# Patient Record
Sex: MALE | Race: WHITE
[De-identification: names, ages, dates, MRNs, and addresses within clinical notes are randomized per-mention and may not be internally consistent; named-entity substitution may affect disease eponyms.]

---

## 2014-06-06 VITALS
SYSTOLIC BLOOD PRESSURE: 154 MMHG | DIASTOLIC BLOOD PRESSURE: 87 MMHG | SYSTOLIC BLOOD PRESSURE: 154 MMHG | DIASTOLIC BLOOD PRESSURE: 87 MMHG | DIASTOLIC BLOOD PRESSURE: 87 MMHG | SYSTOLIC BLOOD PRESSURE: 154 MMHG | SYSTOLIC BLOOD PRESSURE: 154 MMHG | SYSTOLIC BLOOD PRESSURE: 154 MMHG | SYSTOLIC BLOOD PRESSURE: 154 MMHG | DIASTOLIC BLOOD PRESSURE: 87 MMHG | DIASTOLIC BLOOD PRESSURE: 87 MMHG | SYSTOLIC BLOOD PRESSURE: 154 MMHG | DIASTOLIC BLOOD PRESSURE: 87 MMHG | SYSTOLIC BLOOD PRESSURE: 154 MMHG | DIASTOLIC BLOOD PRESSURE: 87 MMHG | DIASTOLIC BLOOD PRESSURE: 87 MMHG | DIASTOLIC BLOOD PRESSURE: 87 MMHG | SYSTOLIC BLOOD PRESSURE: 154 MMHG | SYSTOLIC BLOOD PRESSURE: 154 MMHG | DIASTOLIC BLOOD PRESSURE: 87 MMHG

## 2017-03-13 ENCOUNTER — HOSPITAL ENCOUNTER (OUTPATIENT)
Dept: HOSPITAL 61 - LAB | Age: 67
Discharge: HOME | End: 2017-03-13
Payer: MEDICARE

## 2017-03-13 DIAGNOSIS — N12: ICD-10-CM

## 2017-03-13 DIAGNOSIS — N18.3: Primary | ICD-10-CM

## 2017-03-13 DIAGNOSIS — N13.30: ICD-10-CM

## 2017-03-13 DIAGNOSIS — I10: ICD-10-CM

## 2017-03-13 LAB
ALBUMIN SERPL-MCNC: 4 G/DL (ref 3.4–5)
ANION GAP SERPL CALC-SCNC: 7 MMOL/L (ref 6–14)
BASOPHILS # BLD AUTO: 0.1 X10^3/UL (ref 0–0.2)
BASOPHILS NFR BLD: 1 % (ref 0–3)
BUN SERPL-MCNC: 20 MG/DL (ref 8–26)
CALCIUM SERPL-MCNC: 9.3 MG/DL (ref 8.5–10.1)
CHLORIDE SERPL-SCNC: 106 MMOL/L (ref 98–107)
CO2 SERPL-SCNC: 31 MMOL/L (ref 21–32)
CREAT SERPL-MCNC: 1.2 MG/DL (ref 0.7–1.3)
EOSINOPHIL NFR BLD: 2 % (ref 0–3)
ERYTHROCYTE [DISTWIDTH] IN BLOOD BY AUTOMATED COUNT: 14 % (ref 11.5–14.5)
GFR SERPLBLD BASED ON 1.73 SQ M-ARVRAT: 60.2 ML/MIN
GLUCOSE SERPL-MCNC: 91 MG/DL (ref 70–99)
HCT VFR BLD CALC: 47 % (ref 39–53)
HGB BLD-MCNC: 16 G/DL (ref 13–17.5)
LYMPHOCYTES # BLD: 3.2 X10^3/UL (ref 1–4.8)
LYMPHOCYTES NFR BLD AUTO: 32 % (ref 24–48)
MCH RBC QN AUTO: 32 PG (ref 25–35)
MCHC RBC AUTO-ENTMCNC: 34 G/DL (ref 31–37)
MCV RBC AUTO: 95 FL (ref 79–100)
MONOCYTES NFR BLD: 8 % (ref 0–9)
NEUTROPHILS NFR BLD AUTO: 57 % (ref 31–73)
PHOSPHATE SERPL-MCNC: 4 MG/DL (ref 2.6–4.7)
PLATELET # BLD AUTO: 262 X10^3/UL (ref 140–400)
POTASSIUM SERPL-SCNC: 4.3 MMOL/L (ref 3.5–5.1)
RBC # BLD AUTO: 4.95 X10^6/UL (ref 4.3–5.7)
SODIUM SERPL-SCNC: 144 MMOL/L (ref 136–145)
WBC # BLD AUTO: 10.2 X10^3/UL (ref 4–11)

## 2017-03-13 PROCEDURE — 83970 ASSAY OF PARATHORMONE: CPT

## 2017-03-13 PROCEDURE — 36415 COLL VENOUS BLD VENIPUNCTURE: CPT

## 2017-03-13 PROCEDURE — 80069 RENAL FUNCTION PANEL: CPT

## 2017-03-13 PROCEDURE — 85027 COMPLETE CBC AUTOMATED: CPT

## 2017-03-14 LAB — PTH-INTACT SERPL-MCNC: 59 PG/ML (ref 15–65)

## 2017-08-22 ENCOUNTER — HOSPITAL ENCOUNTER (OUTPATIENT)
Dept: HOSPITAL 61 - CT | Age: 67
Discharge: HOME | End: 2017-08-22
Attending: INTERNAL MEDICINE
Payer: MEDICARE

## 2017-08-22 DIAGNOSIS — R91.1: ICD-10-CM

## 2017-08-22 DIAGNOSIS — R91.8: Primary | ICD-10-CM

## 2017-08-22 PROCEDURE — 71250 CT THORAX DX C-: CPT

## 2017-08-22 NOTE — RAD
CT chest without IV contrast



Indication: 66-year-old male for follow-up of lung nodule.



Technique: CT chest without IV contrast with multi planar reformats.



Comparison: Previous CT from 11/8/2016.



Findings:

Neck base is clear. No axillary, mediastinal or hilar adenopathy. Heart is

normal in size. Coronary artery calcifications noted. No pericardial or

pleural effusion. CABG changes noted.

 Lung nodules as follows:

-Stable left lower lobe nodule measuring 8 mm (series 3 image 216).

-Stable right lower lobe nodule measuring 5 mm (series 3 image 114).

-Stable 2 mm nodule in the inferior aspect of the right upper lobe (series 3

image 142).

-Stable triangular opacity measuring 3 mm in the right middle lobe (series 3

image 181).



The noncontrast appearance of the visualized liver, spleen, pancreas, adrenals

and kidneys is within normal limits. No radiopaque gallstones. Stable 2.6 x

6.2 cm right anterolateral lower chest wall intramuscular lipoma. Stable 1 cm

superficial lesion within the right lateral chest wall likely sebaceous cyst.



No suspicious bony lesion.



Impression:

Stable pulmonary nodules, the largest in the left lower lobe measuring 8 mm.

Follow-up study in 6-12 months recommended.



PQRS Compliance Statement:



One or more of the following individualized dose reduction techniques were

utilized for this examination:

1. Automated exposure control

2. Adjustment of the mA and/or kV according to patient size

3. Use of iterative reconstruction technique

## 2017-09-11 ENCOUNTER — HOSPITAL ENCOUNTER (OUTPATIENT)
Dept: HOSPITAL 61 - LAB | Age: 67
Discharge: HOME | End: 2017-09-11
Payer: MEDICARE

## 2017-09-11 DIAGNOSIS — I12.9: Primary | ICD-10-CM

## 2017-09-11 DIAGNOSIS — N18.2: ICD-10-CM

## 2017-09-11 DIAGNOSIS — N12: ICD-10-CM

## 2017-09-11 LAB
ALBUMIN SERPL-MCNC: 3.8 G/DL (ref 3.4–5)
ANION GAP SERPL CALC-SCNC: 5 MMOL/L (ref 6–14)
BUN SERPL-MCNC: 15 MG/DL (ref 8–26)
CALCIUM SERPL-MCNC: 8.8 MG/DL (ref 8.5–10.1)
CHLORIDE SERPL-SCNC: 103 MMOL/L (ref 98–107)
CO2 SERPL-SCNC: 30 MMOL/L (ref 21–32)
CREAT SERPL-MCNC: 1.2 MG/DL (ref 0.7–1.3)
GFR SERPLBLD BASED ON 1.73 SQ M-ARVRAT: 60.2 ML/MIN
GLUCOSE SERPL-MCNC: 127 MG/DL (ref 70–99)
PHOSPHATE SERPL-MCNC: 3.2 MG/DL (ref 2.6–4.7)
POTASSIUM SERPL-SCNC: 4.3 MMOL/L (ref 3.5–5.1)
SODIUM SERPL-SCNC: 138 MMOL/L (ref 136–145)

## 2017-09-11 PROCEDURE — 36415 COLL VENOUS BLD VENIPUNCTURE: CPT

## 2017-09-11 PROCEDURE — 80069 RENAL FUNCTION PANEL: CPT

## 2017-12-04 ENCOUNTER — HOSPITAL ENCOUNTER (OUTPATIENT)
Dept: HOSPITAL 61 - LAB | Age: 67
Discharge: HOME | End: 2017-12-04
Attending: FAMILY MEDICINE
Payer: MEDICARE

## 2017-12-04 ENCOUNTER — HOSPITAL ENCOUNTER (OUTPATIENT)
Dept: HOSPITAL 61 - CT | Age: 67
Discharge: HOME | End: 2017-12-04
Attending: INTERNAL MEDICINE
Payer: MEDICARE

## 2017-12-04 DIAGNOSIS — D17.1: ICD-10-CM

## 2017-12-04 DIAGNOSIS — R91.1: ICD-10-CM

## 2017-12-04 DIAGNOSIS — I70.0: ICD-10-CM

## 2017-12-04 DIAGNOSIS — K31.89: ICD-10-CM

## 2017-12-04 DIAGNOSIS — I71.4: Primary | ICD-10-CM

## 2017-12-04 DIAGNOSIS — Z12.5: Primary | ICD-10-CM

## 2017-12-04 DIAGNOSIS — N32.89: ICD-10-CM

## 2017-12-04 DIAGNOSIS — I71.4: ICD-10-CM

## 2017-12-04 DIAGNOSIS — K82.8: ICD-10-CM

## 2017-12-04 DIAGNOSIS — E78.5: ICD-10-CM

## 2017-12-04 DIAGNOSIS — I10: ICD-10-CM

## 2017-12-04 LAB
ALBUMIN SERPL-MCNC: 4.1 G/DL (ref 3.4–5)
ALBUMIN/GLOB SERPL: 1.1 {RATIO} (ref 1–1.7)
ALP SERPL-CCNC: 91 U/L (ref 46–116)
ALT SERPL-CCNC: 33 U/L (ref 16–63)
ANION GAP SERPL CALC-SCNC: 7 MMOL/L (ref 6–14)
AST SERPL-CCNC: 25 U/L (ref 15–37)
BASOPHILS # BLD AUTO: 0.1 X10^3/UL (ref 0–0.2)
BASOPHILS NFR BLD: 1 % (ref 0–3)
BILIRUB SERPL-MCNC: 0.5 MG/DL (ref 0.2–1)
BUN SERPL-MCNC: 19 MG/DL (ref 8–26)
BUN/CREAT SERPL: 16 (ref 6–20)
CALCIUM SERPL-MCNC: 9.2 MG/DL (ref 8.5–10.1)
CHLORIDE SERPL-SCNC: 103 MMOL/L (ref 98–107)
CHOLEST SERPL-MCNC: 153 MG/DL (ref 0–200)
CHOLEST/HDLC SERPL: 2.6 {RATIO}
CO2 SERPL-SCNC: 30 MMOL/L (ref 21–32)
CREAT SERPL-MCNC: 1.2 MG/DL (ref 0.7–1.3)
EOSINOPHIL NFR BLD: 4 % (ref 0–3)
ERYTHROCYTE [DISTWIDTH] IN BLOOD BY AUTOMATED COUNT: 14.1 % (ref 11.5–14.5)
GFR SERPLBLD BASED ON 1.73 SQ M-ARVRAT: 60 ML/MIN
GLOBULIN SER-MCNC: 3.8 G/DL (ref 2.2–3.8)
GLUCOSE SERPL-MCNC: 101 MG/DL (ref 70–99)
HCT VFR BLD CALC: 46.5 % (ref 39–53)
HDLC SERPL-MCNC: 60 MG/DL (ref 40–60)
HGB BLD-MCNC: 15.8 G/DL (ref 13–17.5)
LYMPHOCYTES # BLD: 3 X10^3/UL (ref 1–4.8)
LYMPHOCYTES NFR BLD AUTO: 34 % (ref 24–48)
MCH RBC QN AUTO: 33 PG (ref 25–35)
MCHC RBC AUTO-ENTMCNC: 34 G/DL (ref 31–37)
MCV RBC AUTO: 97 FL (ref 79–100)
MONOCYTES NFR BLD: 9 % (ref 0–9)
NEUTROPHILS NFR BLD AUTO: 53 % (ref 31–73)
NONHDLC SERPL-MCNC: 93 MG/DL (ref 0–129)
PLATELET # BLD AUTO: 300 X10^3/UL (ref 140–400)
POTASSIUM SERPL-SCNC: 5 MMOL/L (ref 3.5–5.1)
PROT SERPL-MCNC: 7.9 G/DL (ref 6.4–8.2)
RBC # BLD AUTO: 4.81 X10^6/UL (ref 4.3–5.7)
SODIUM SERPL-SCNC: 140 MMOL/L (ref 136–145)
TRIGL SERPL-MCNC: 77 MG/DL (ref 0–150)
WBC # BLD AUTO: 8.8 X10^3/UL (ref 4–11)

## 2017-12-04 PROCEDURE — 80061 LIPID PANEL: CPT

## 2017-12-04 PROCEDURE — 74174 CTA ABD&PLVS W/CONTRAST: CPT

## 2017-12-04 PROCEDURE — 36415 COLL VENOUS BLD VENIPUNCTURE: CPT

## 2017-12-04 PROCEDURE — 80053 COMPREHEN METABOLIC PANEL: CPT

## 2017-12-04 PROCEDURE — G0103 PSA SCREENING: HCPCS

## 2017-12-04 PROCEDURE — 85025 COMPLETE CBC W/AUTO DIFF WBC: CPT

## 2017-12-04 NOTE — RAD
Examination: CT angiogram of the abdomen pelvis 



History: History of abdominal aortic aneurysm



Comparison: 9/21/2016



Technique: Axial CT angiographic images of the abdomen pelvis were performed

with IV contrast. Coronal and sagittal 3-D MIP reformats are performed. 3-D

volumetric reformats of the abdomen that was performed.





PQRS Compliance Statement:



One or more of the following individualized dose reduction techniques were

utilized for this examination:

1. Automated exposure control

2. Adjustment of the mA and/or kV according to patient size

3. Use of iterative reconstruction technique.





Findings:



There is a 6 mm nodule identified in the left lower lobe of the lung similar

to prior exam.



No evidence of free air identified in the abdomen.



The visualized liver, spleen, adrenals grossly appears unremarkable , the

gallbladder is mildly distended.



The stomach is minimally distended.



The visualized pancreas grossly appears unremarkable.



The small bowel is nondilated.



The appendix is normal.



Feces and gas noted throughout the colon.



Multiple colon diverticula identified.



Urinary bladder is moderately distended.







Moderate aortic atherosclerosis. Infrarenal abdominal aortic aneurysm is

identified measuring 3.1 cm in AP dimension and 3.4 cm in transverse

dimension. The visualized celiac artery, superior mesenteric artery, inferior

mesenteric artery are patent. The bilateral renal arteries are patent.



The bilateral kidneys enhance symmetrically.



Mild prominent left extrarenal pelvis or a chronic low-grade UPJ obstruction

similar to prior exam.



Moderate atherosclerosis of the bilateral external and internal iliac arteries

are identified.



Urinary bladder is mildly distended.



A lipoma is identified in the right chest wall similar to prior exam.



Mild degenerative changes lumbar spine.







Impression:



Focal abdominal aortic aneurysm measuring 3.1 x 3.4 cm in the infrarenal

abdominal aorta, similar to prior exam.

## 2018-08-10 ENCOUNTER — HOSPITAL ENCOUNTER (OUTPATIENT)
Dept: HOSPITAL 61 - CT | Age: 68
Discharge: HOME | End: 2018-08-10
Attending: INTERNAL MEDICINE
Payer: MEDICARE

## 2018-08-10 DIAGNOSIS — I50.9: ICD-10-CM

## 2018-08-10 DIAGNOSIS — N18.3: ICD-10-CM

## 2018-08-10 DIAGNOSIS — R91.8: ICD-10-CM

## 2018-08-10 DIAGNOSIS — I13.0: ICD-10-CM

## 2018-08-10 DIAGNOSIS — I25.2: ICD-10-CM

## 2018-08-10 DIAGNOSIS — I25.10: Primary | ICD-10-CM

## 2018-08-10 PROCEDURE — 71250 CT THORAX DX C-: CPT

## 2019-01-10 ENCOUNTER — HOSPITAL ENCOUNTER (OUTPATIENT)
Dept: HOSPITAL 61 - LAB | Age: 69
Discharge: HOME | End: 2019-01-10
Payer: MEDICARE

## 2019-01-10 DIAGNOSIS — N12: ICD-10-CM

## 2019-01-10 DIAGNOSIS — I12.9: Primary | ICD-10-CM

## 2019-01-10 DIAGNOSIS — N18.2: ICD-10-CM

## 2019-01-10 LAB
ALBUMIN SERPL-MCNC: 3.6 G/DL (ref 3.4–5)
ANION GAP SERPL CALC-SCNC: 7 MMOL/L (ref 6–14)
BUN SERPL-MCNC: 12 MG/DL (ref 8–26)
CALCIUM SERPL-MCNC: 9.3 MG/DL (ref 8.5–10.1)
CHLORIDE SERPL-SCNC: 101 MMOL/L (ref 98–107)
CO2 SERPL-SCNC: 32 MMOL/L (ref 21–32)
CREAT SERPL-MCNC: 1.2 MG/DL (ref 0.7–1.3)
GFR SERPLBLD BASED ON 1.73 SQ M-ARVRAT: 59.9 ML/MIN
GLUCOSE SERPL-MCNC: 98 MG/DL (ref 70–99)
PHOSPHATE SERPL-MCNC: 3.6 MG/DL (ref 2.6–4.7)
POTASSIUM SERPL-SCNC: 4.5 MMOL/L (ref 3.5–5.1)
SODIUM SERPL-SCNC: 140 MMOL/L (ref 136–145)

## 2019-01-10 PROCEDURE — 36415 COLL VENOUS BLD VENIPUNCTURE: CPT

## 2019-01-10 PROCEDURE — 80069 RENAL FUNCTION PANEL: CPT

## 2019-07-26 ENCOUNTER — HOSPITAL ENCOUNTER (EMERGENCY)
Dept: HOSPITAL 61 - ER | Age: 69
Discharge: HOME | End: 2019-07-26
Payer: MEDICARE

## 2019-07-26 VITALS — WEIGHT: 170 LBS | BODY MASS INDEX: 26.68 KG/M2 | HEIGHT: 67 IN

## 2019-07-26 VITALS — SYSTOLIC BLOOD PRESSURE: 138 MMHG | DIASTOLIC BLOOD PRESSURE: 90 MMHG
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
## 2019-07-26 DIAGNOSIS — M54.2: ICD-10-CM

## 2019-07-26 DIAGNOSIS — Z88.2: ICD-10-CM
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
## 2019-07-26 DIAGNOSIS — S01.01XA: Primary | ICD-10-CM

## 2019-07-26 DIAGNOSIS — Z88.0: ICD-10-CM
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
## 2019-07-26 DIAGNOSIS — R51: ICD-10-CM

## 2019-07-26 PROCEDURE — 70450 CT HEAD/BRAIN W/O DYE: CPT

## 2019-07-26 PROCEDURE — 12002 RPR S/N/AX/GEN/TRNK2.6-7.5CM: CPT

## 2019-07-26 PROCEDURE — 72125 CT NECK SPINE W/O DYE: CPT

## 2019-07-26 NOTE — PHYS DOC
Past Medical History


Past Medical History:  Other


Additional Past Medical Histor:  [place] [facility] [place]


Past Surgical History:  No Surgical History


Alcohol Use:  Heavy


Drug Use:  None





Adult General


Chief Complaint


Chief Complaint:  MECHANICAL FALL





HPI


HPI





Patient is a 68  year old male who presents after he fell this happened prior to

arrival. The patient denies loss of consciousness, patient states he tripped on 

the carpet and hit his head on the doorknob. The patient is a laceration to the 

occipital portion of her head. States he takes 6-325 mg aspirin every day for 

pain control. States he has had several cocktails this evening prior to falling.





Review of Systems


Review of Systems





Constitutional: Denies fever or chills []


Eyes: Denies change in visual acuity, redness, or eye pain []


HENT: Denies nasal congestion or sore throat []


Respiratory: Denies cough or shortness of breath []


Cardiovascular: No additional information not addressed in HPI []


GI: Denies abdominal pain, nausea, vomiting, bloody stools or diarrhea []


: Denies dysuria or hematuria []


Musculoskeletal: Denies back pain or joint pain []


Integument: Reports laceration to scalp.


Neurologic: Reports headache, denies focal weakness or sensory changes []


Endocrine: Denies polyuria or polydipsia []





Complete systems were reviewed and found to be within normal limits, except as 

documented in this note.





Current Medications


Current Medications





Current Medications








 Medications


  (Trade)  Dose


 Ordered  Sig/[facility]  Start Time


 Stop Time Status Last Admin


Dose Admin


 


 Lidocaine HCl


  (Lidocaine 1%


 20ml Vial)  20 ml  1X  ONCE  19 21:00


 19 21:01 DC 19 21:26


20 ML











Allergies


Allergies





Allergies








Coded Allergies Type Severity Reaction Last Updated Verified


 


  Penicillins Allergy Severe Swelling 14 Yes


 


  Sulfa (Sulfonamide Antibiotics) Allergy Severe THROAT SWELLING 14 Yes











Physical Exam


Physical Exam





Constitutional: Well developed, well nourished, no acute distress, non-toxic 

appearance. []


HENT: Normocephalic, atraumatic, bilateral external ears normal, oropharynx 

moist, no oral exudates, nose normal. []


Eyes: PERRLA, EOMI, conjunctiva normal, no discharge. [] 


Neck: Normal range of motion, no tenderness, supple, no stridor. [] 


Cardiovascular:Heart rate regular rhythm, no murmur []


Lungs & Thorax:  Bilateral breath sounds clear to auscultation []


Abdomen: Bowel sounds normal, soft, no tenderness, no masses, no pulsatile 

masses. [] 


Skin: Warm, dry, no erythema, laceration to occipital portion of head 2.5 cm.


Back: No tenderness, no CVA tenderness. [] 


Extremities: No tenderness, no cyanosis, no clubbing, ROM intact, no edema. [] 


Neurologic: Alert and oriented X 3, normal motor function, normal sensory 

function, no focal deficits noted. []


Psychologic: Affect normal, judgement normal, mood normal. []





Current Patient Data


Vital Signs





                                   Vital Signs








  Date Time  Temp Pulse Resp B/P (MAP) Pulse Ox O2 Delivery O2 Flow Rate FiO2


 


19 20:45 98.6 60 20 138/90 (106) 95 Room Air  





 98.6       











EKG


EKG


[]





Radiology/Procedures


Radiology/Procedures





Indication: Head laceration





Procedure: The patient was placed in the appropriate position and anesthesia 

around the lidocaine 1%. The area was then debrided with 120 mL of saline. The 

laceration was closed with 6 staples. The wound area was then dressed with wound

 covering.





Total repaired wound length: 3 cm.








The patient tolerated the procedure [TOLERATED].





Complications: None








[]PATIENT: TOÑA JANE LACCOUNT: [id number]#: [id number]


: 1950           LOCATION: ER              AGE: 68


SEX: M                    EXAM DT: 19         ACCESSION#: [id number]


STATUS: REG ER            ORD. PHYSICIAN: SHAZIA GUILLAUME   


REASON: fall, POSTERIOR HEAD BLEEDING.


PROCEDURE: CT HEAD AND CERVICAL SPINE WO





INDICATION: Trauma


 


COMPARISON: None.


 


TECHNIQUE:


 


Axial CT images obtained through the head and cervical spine.


 


One or more of the following individualized dose reduction techniques were


utilized for this examination:  1. Automated exposure control;  2. 


Adjustment of the mA and/or kV according to patient size;  3. Use of 


iterative reconstruction technique.


 


FINDINGS:


 


Head:


 


No midline shift.


Suprasellar cistern is not effaced.


Scattered foci of low density of the white matter.


Right-sided scalp cephalohematoma, small.


No definite acute intracranial hemorrhage.


Small amount of fluid in the right maxillary sinus which is small in size.


 


 


Cervical spine:


 


Degenerative changes throughout the cervical spine with osteophyte 


formation at the vertebral body endplates as well as uncovertebral and 


facet hypertrophy with multilevel central canal and neural foraminal 


stenosis. For example there is a large disc osteophyte complex at C4-5 


with resultant narrowing of the central canal which appears severe in 


nature. Grade 1 anterolisthesis of C7 on T1.


No definite acute fracture.


Cystic changes at lung apices which can be seen with emphysema.


 


IMPRESSION:


 


1.  No acute intracranial hemorrhage.


 


2.  Scattered low density of white matter. Nonspecific but can be seen 


with chronic small vessel ischemic disease.


 


3.  Severe degenerative changes of the cervical spine with multilevel 


central canal neural foraminal stenosis without a definite acute fracture.


 


Electronically signed by: Elisa Richards MD (2019 9:53 PM) [facility]














DICTATED and SIGNED BY:     ELISA RICHARDS MD


DATE:     19





Course & Med Decision Making


Course & Med Decision Making


Pertinent Labs and Imaging studies reviewed. (See chart for details)





Will get CT of head and neck, patient is already in C-collar. Will also staple 

laceration.





Cleared c-collar as patient was having no pain with head movement and no c-spine

 tenderness. Patient is clinically sober, and can walk steadily and talk 

(23:26). 





Stapled laceration.





Dragon Disclaimer


Dragon Disclaimer


This electronic medical record was generated, in whole or in part, using a voice

 recognition dictation system.





Departure


Departure


Impression:  


   Primary Impression:  


   Fall


   Additional Impression:  


   Laceration


Disposition:  01 HOME, SELF-CARE


Condition:  STABLE


Referrals:  


LORETTA MONTERO MD (PCP)


Patient Instructions:  Laceration Care, Adult





Additional Instructions:  


Thank you for visiting [facility]. We appreciate you trusting us 

with your care. If any additional problems come up don't hesitate to return to 

visit us. Please follow up with your primary care provider so they can plan 

additional care if needed and know about the problem that you had. If symptoms 

worsen come back to the Emergency Department. Any concerning symptoms that start

 such as chest pain, shortness of air, weakness or numbness on one side of the 

body, running high fevers or any other concerning symptoms return to the ER.








Please have staples removed in 5-7 days. Place Neosporin on wound. Keep clean.





Problem Qualifiers








   Primary Impression:  


   Fall


   Encounter type:  initial encounter  Qualified Codes:  W19.XXXA - Unspecified 

   fall, initial encounter








SHAZIA GUILLAUME          2019 20:59

## 2019-07-26 NOTE — RAD
INDICATION: Trauma

 

COMPARISON: None.

 

TECHNIQUE:

 

Axial CT images obtained through the head and cervical spine.

 

One or more of the following individualized dose reduction techniques were

utilized for this examination:  1. Automated exposure control;  2. 

Adjustment of the mA and/or kV according to patient size;  3. Use of 

iterative reconstruction technique.

 

FINDINGS:

 

Head:

 

No midline shift.

Suprasellar cistern is not effaced.

Scattered foci of low density of the white matter.

Right-sided scalp cephalohematoma, small.

No definite acute intracranial hemorrhage.

Small amount of fluid in the right maxillary sinus which is small in size.

 

 

Cervical spine:

 

Degenerative changes throughout the cervical spine with osteophyte 

formation at the vertebral body endplates as well as uncovertebral and 

facet hypertrophy with multilevel central canal and neural foraminal 

stenosis. For example there is a large disc osteophyte complex at C4-5 

with resultant narrowing of the central canal which appears severe in 

nature. Grade 1 anterolisthesis of C7 on T1.

No definite acute fracture.

Cystic changes at lung apices which can be seen with emphysema.

 

IMPRESSION:

 

1.  No acute intracranial hemorrhage.

 

2.  Scattered low density of white matter. Nonspecific but can be seen 

with chronic small vessel ischemic disease.

 

3.  Severe degenerative changes of the cervical spine with multilevel 

central canal neural foraminal stenosis without a definite acute fracture.

 

Electronically signed by: Mike Sherman MD (7/26/2019 9:53 PM) [facility]

## 2019-09-27 ENCOUNTER — HOSPITAL ENCOUNTER (OUTPATIENT)
Dept: HOSPITAL 61 - KCIC | Age: 69
Discharge: HOME | End: 2019-09-27
Attending: FAMILY MEDICINE
Payer: MEDICARE

## 2019-09-27 DIAGNOSIS — R91.1: ICD-10-CM
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
## 2019-09-27 DIAGNOSIS — M79.674: ICD-10-CM

## 2019-09-27 DIAGNOSIS — S92.414A: Primary | ICD-10-CM

## 2019-09-27 DIAGNOSIS — Z87.891: ICD-10-CM
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
## 2019-09-27 DIAGNOSIS — Z95.1: ICD-10-CM

## 2019-09-27 DIAGNOSIS — R06.00: ICD-10-CM
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
## 2019-09-27 PROCEDURE — 73660 X-RAY EXAM OF TOE(S): CPT

## 2019-09-27 PROCEDURE — 71046 X-RAY EXAM CHEST 2 VIEWS: CPT

## 2019-09-27 NOTE — [facility]
EXAM: Right great toe, 3 views.

 

HISTORY: Fall.

 

COMPARISON: None.

 

FINDINGS: 3 views of the right great toe are obtained. There is a 

nondisplaced intra-articular fracture of the mid and distal first proximal

phalanx. There is minimal spurring involving the base of the first 

proximal phalanx.

 

IMPRESSION: Nondisplaced intra-articular fracture of the mid to distal 

right first proximal phalanx.

 

Electronically signed by: Margoth Vines MD (9/27/2019 3:32 PM) [place] [id number]

## 2020-02-12 ENCOUNTER — HOSPITAL ENCOUNTER (OUTPATIENT)
Dept: HOSPITAL 61 - KCIC US | Age: 70
Discharge: HOME | End: 2020-02-12
Attending: FAMILY MEDICINE
Payer: MEDICARE

## 2020-02-12 DIAGNOSIS — I71.4: Primary | ICD-10-CM

## 2020-02-12 DIAGNOSIS — I70.0: ICD-10-CM

## 2020-02-12 PROCEDURE — 76770 US EXAM ABDO BACK WALL COMP: CPT

## 2020-02-12 NOTE — [facility]
SCAN OF ABDOMINAL AORTA

 

History: Abdominal aortic aneurysm

 

Comparison: December 4, 2017 CT exam abdomen pelvis

 

Findings:

Multiple sonographic images of the abdomen are submitted. There is diffuse

plaque of the abdominal aorta and iliac arteries. Maximal caliber of the 

proximal abdominal aorta was 3 cm and 2.9 cm at the mid segment. Distal 

abdominal aorta is dilated in maximal dimension up to 3.9 cm, somewhat 

greater as previous maximal dimension about 3.5 cc. Maximal distal 

abdominal aortic peak systolic velocity 60 cm/s.

 

Impression: 

 

1.  There is distal abdominal aortic aneurysm up to 3.9 cm maximal axial 

dimension, somewhat increased compared with 2017 CTA exam. There is 

diffuse scattered plaque.

 

Electronically signed by: Lavelle Jones MD (2/12/2020 2:12 PM) 

[id number]

## 2020-09-17 ENCOUNTER — HOSPITAL ENCOUNTER (OUTPATIENT)
Dept: HOSPITAL 61 - US | Age: 70
Discharge: HOME | End: 2020-09-17
Attending: NURSE PRACTITIONER
Payer: MEDICARE

## 2020-09-17 DIAGNOSIS — I70.0: ICD-10-CM

## 2020-09-17 DIAGNOSIS — I71.4: Primary | ICD-10-CM

## 2020-09-17 PROCEDURE — 76770 US EXAM ABDO BACK WALL COMP: CPT

## 2020-09-17 PROCEDURE — 76700 US EXAM ABDOM COMPLETE: CPT

## 2020-09-17 NOTE — RAD
Examination: Complete abdominal ultrasound

 

INDICATION: Vertigo: Transabdominal pain.

 

COMPARISON: CT angiogram abdomen and pelvis of 12/4/2017.

 

TECHNIQUE: Grayscale and, color and spectral Doppler imaging of the 

abdomen was performed.

 

FINDINGS:

Liver measures 14.7 cm in length and is unremarkable with no masses or 

nodular contour. Echogenicity is unremarkable.

 

The gallbladder is sonographically unremarkable with wall thickness of 1.7

mm

No stones, sludge or masses. There is no intra or extrahepatic biliary 

dilation with the common bile duct measuring 2 mm at the fauzia hepatis.

 

Spleen is poorly visualized. It measures approximately 8 cm.

 

Right kidney measures 9.9 x 5.1 x 4.5 cm and is unremarkable.

Left kidney measures 9.3 x 4.6 x 5.4 cm and is also unremarkable.

No hydronephrosis, shadowing stones or perinephric fluid identified in 

either kidney.

 

Patent, normal directional flow in the main portal vein. Visualized IVC is

unremarkable. Abdominal aorta is atherosclerotic calcifications and is 

ectatic to 3.8 cm. This is evaluated in greater detail on same-day 

abdominal aortic ultrasound. Please see that report for additional 

details.

 

IMPRESSION:

No specific cause for right upper quadrant abdominal pain is identified. 

In particular, the liver and gallbladder are unremarkable and there is no 

biliary dilation.

 

 

Examination: Abdominal aorta ultrasound

 

INDICATION: Abdominal pain, abdominal aortic aneurysm

 

COMPARISON: 12/4/2017 CT abdomen with IV contrast.

 

TECHNIQUE: Grayscale, color and spectral Doppler imaging of the abdominal 

aorta and common iliac arteries was performed

 

FINDINGS:

 

Proximal abdominal aorta is obscured by bowel gas.

 

Mid abdominal aorta shows atherosclerotic calcifications along its length.

Measures 2.3 cm AP diameter in the longitudinal projection. Peak systolic 

velocity is 70 cm/s. Resistive index of 0.8.

 

Distal abdominal aorta is dilated to 3.4 cm AP diameter, 3.8 cm transverse

diameter. 

Peak systolic velocity of 53 cm/s. Resistive index of 0.71. 

Calcified plaque is present in the wall. No evidence of dissection 

identified.

 

Right common iliac artery measures 1.0 cm transverse diameter, 1.1 cm AP 

diameter.

It shows a peak systolic velocity of 141 cm/s, resistive index of 0.86.

 

Left common iliac artery measures 1.0 x 0.9 cm AP by transverse. 

It shows a peak systolic velocity of 163 cm/s. Resistive index of 0.87.

 

IMPRESSION:

Infrarenal abdominal aortic aneurysm measuring 3.4 x 3.8 cm (AP by 

transverse) showing no evidence of leak , rupture or dissection. Proximal 

abdominal aorta obscured by bowel gas.

 

Electronically signed by: Cony Stuart MD (9/17/2020 10:15 AM) 

[id number]

## 2020-09-17 NOTE — RAD
Examination: Complete abdominal ultrasound

 

INDICATION: Vertigo: Transabdominal pain.

 

COMPARISON: CT angiogram abdomen and pelvis of 12/4/2017.

 

TECHNIQUE: Grayscale and, color and spectral Doppler imaging of the 

abdomen was performed.

 

FINDINGS:

Liver measures 14.7 cm in length and is unremarkable with no masses or 

nodular contour. Echogenicity is unremarkable.

 

The gallbladder is sonographically unremarkable with wall thickness of 1.7

mm

No stones, sludge or masses. There is no intra or extrahepatic biliary 

dilation with the common bile duct measuring 2 mm at the fauzia hepatis.

 

Spleen is poorly visualized. It measures approximately 8 cm.

 

Right kidney measures 9.9 x 5.1 x 4.5 cm and is unremarkable.

Left kidney measures 9.3 x 4.6 x 5.4 cm and is also unremarkable.

No hydronephrosis, shadowing stones or perinephric fluid identified in 

either kidney.

 

Patent, normal directional flow in the main portal vein. Visualized IVC is

unremarkable. Abdominal aorta is atherosclerotic calcifications and is 

ectatic to 3.8 cm. This is evaluated in greater detail on same-day 

abdominal aortic ultrasound. Please see that report for additional 

details.

 

IMPRESSION:

No specific cause for right upper quadrant abdominal pain is identified. 

In particular, the liver and gallbladder are unremarkable and there is no 

biliary dilation.

 

 

Examination: Abdominal aorta ultrasound

 

INDICATION: Abdominal pain, abdominal aortic aneurysm

 

COMPARISON: 12/4/2017 CT abdomen with IV contrast.

 

TECHNIQUE: Grayscale, color and spectral Doppler imaging of the abdominal 

aorta and common iliac arteries was performed

 

FINDINGS:

 

Proximal abdominal aorta is obscured by bowel gas.

 

Mid abdominal aorta shows atherosclerotic calcifications along its length.

Measures 2.3 cm AP diameter in the longitudinal projection. Peak systolic 

velocity is 70 cm/s. Resistive index of 0.8.

 

Distal abdominal aorta is dilated to 3.4 cm AP diameter, 3.8 cm transverse

diameter. 

Peak systolic velocity of 53 cm/s. Resistive index of 0.71. 

Calcified plaque is present in the wall. No evidence of dissection 

identified.

 

Right common iliac artery measures 1.0 cm transverse diameter, 1.1 cm AP 

diameter.

It shows a peak systolic velocity of 141 cm/s, resistive index of 0.86.

 

Left common iliac artery measures 1.0 x 0.9 cm AP by transverse. 

It shows a peak systolic velocity of 163 cm/s. Resistive index of 0.87.

 

IMPRESSION:

Infrarenal abdominal aortic aneurysm measuring 3.4 x 3.8 cm (AP by 

transverse) showing no evidence of leak , rupture or dissection. Proximal 

abdominal aorta obscured by bowel gas.

 

Electronically signed by: Cony Stuart MD (9/17/2020 10:15 AM) 

[id number]

## 2020-09-28 ENCOUNTER — HOSPITAL ENCOUNTER (OUTPATIENT)
Dept: HOSPITAL 61 - KCIC CT | Age: 70
Discharge: HOME | End: 2020-09-28
Attending: FAMILY MEDICINE
Payer: MEDICARE

## 2020-09-28 DIAGNOSIS — R63.4: ICD-10-CM

## 2020-09-28 DIAGNOSIS — M48.061: ICD-10-CM

## 2020-09-28 DIAGNOSIS — K57.30: Primary | ICD-10-CM

## 2020-09-28 DIAGNOSIS — K86.89: ICD-10-CM

## 2020-09-28 PROCEDURE — 74177 CT ABD & PELVIS W/CONTRAST: CPT

## 2020-09-28 NOTE — [facility]
CT abdomen and pelvis with contrast

 

HISTORY: Abdominal pain 1 month

 

TECHNIQUE:

 

Computed tomographic imaging of the abdomen and pelvis was performed 

following the uneventful intravenous demonstration of 100 cc Omnipaque 

300.

 

COMPARISON: December 4, 2017

 

PQRS Compliance Statement:

 

One or more of the following individualized dose reduction techniques were

utilized for this examination:  

1. Automated exposure control  

2. Adjustment of the mA and/or kV according to patient size  

3. Use of iterative reconstruction technique

 

FINDINGS:

 

Bases are clear.

 

Liver unremarkable

 

Gallbladder negative

 

Adrenal glands negative

 

Kidneys negative

 

Spleen negative

 

Fatty infiltration of the pancreas.

 

Marked stool retention is present in the colon. Normal-appearing appendix.

Abdominal aortic aneurysm measuring 37 x 36 mm. Extensive atherosclerotic 

calcifications are present. Diverticulosis without acute diverticulitis 

evident.

 

L3-4 bulging disc with moderate central stenosis.

 

Seminal vesicles and prostate unremarkable

 

Bladder unremarkable

 

IMPRESSION:

 

Marked stool retention, diverticulosis no acute diverticulitis

 

L4-5 disc bulge with moderate central stenosis

 

 

 

Electronically signed by: Andrew Davidson MD (9/28/2020 3:04 PM) [id number]

## 2020-12-24 ENCOUNTER — HOSPITAL ENCOUNTER (EMERGENCY)
Dept: HOSPITAL 61 - ER | Age: 70
Discharge: HOME | End: 2020-12-24
Payer: MEDICARE

## 2020-12-24 VITALS — BODY MASS INDEX: 24.59 KG/M2 | WEIGHT: 162.26 LBS | HEIGHT: 68 IN

## 2020-12-24 VITALS
SYSTOLIC BLOOD PRESSURE: 153 MMHG | DIASTOLIC BLOOD PRESSURE: 65 MMHG | DIASTOLIC BLOOD PRESSURE: 65 MMHG | SYSTOLIC BLOOD PRESSURE: 153 MMHG

## 2020-12-24 DIAGNOSIS — Z88.2: ICD-10-CM
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
## 2020-12-24 DIAGNOSIS — S22.32XA: Primary | ICD-10-CM

## 2020-12-24 DIAGNOSIS — E78.00: ICD-10-CM

## 2020-12-24 DIAGNOSIS — G89.29: ICD-10-CM
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
## 2020-12-24 DIAGNOSIS — I10: ICD-10-CM

## 2020-12-24 DIAGNOSIS — F10.10: ICD-10-CM

## 2020-12-24 DIAGNOSIS — F17.200: ICD-10-CM
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
## 2020-12-24 DIAGNOSIS — Z88.0: ICD-10-CM

## 2020-12-24 DIAGNOSIS — Z98.890: ICD-10-CM

## 2020-12-24 PROCEDURE — 71046 X-RAY EXAM CHEST 2 VIEWS: CPT

## 2020-12-24 PROCEDURE — 99284 EMERGENCY DEPT VISIT MOD MDM: CPT

## 2020-12-24 NOTE — RAD
XR CHEST 2V



History: Reason: FALL, LEFT-SIDED RIB PAIN LOWER RIB PAIN   



Comparison: Two-view chest September 27, 2019.



Findings: 

There are CABG changes. Cardiac size is normal. Pulmonary vasculature is normal. The lungs are clear.
 No pleural effusion or pneumothorax is seen. 

There is acute traumatic minimally displaced left lateral ninth rib fracture. No other adjacent rib f
ractures are definitely seen but nondisplaced or subtle rib fractures could be obscured due to bony o
verlap.



IMPRESSION: 

1.  No acute cardiopulmonary process.

2.  Acute traumatic minimally displaced left lateral ninth rib fracture.





Electronically signed by: Mason Mccain MD (12/24/2020 11:01 AM) [id number]

## 2020-12-24 NOTE — ED.ADGEN
Past Medical History


Past Medical History:  High Cholesterol, Hypertension, Other


Additional Past Medical Histor:  AAA


Past Surgical History:  Coronary Bypass Surgery


Smoking Status:  Current Every Day Smoker


Alcohol Use:  Heavy


Additional Information:  


'4X A WEEK'


Drug Use:  None





General Adult


EDM:


Chief Complaint:  MECHANICAL FALL





HPI:


HPI:





Patient is a 70  year old male coming in with left-sided rib pain after a fall 

yesterday.  Says he had fallen and caught himself on his bed frame.  Takes 

tramadol.  Chronic pain but has taken 2 today without improvement.  Denies any 

recent illness, fevers, cough, chest pain, vomiting or diarrhea.





Review of Systems:


Review of Systems:


Negative other than HPI





Current Medications:





Current Medications








 Medications


  (Trade)  Dose


 Ordered  Sig/Zechariah  Start Time


 Stop Time Status Last Admin


Dose Admin


 


 Lidocaine


  (Lidoderm)  1 patch  1X  ONCE  12/24/20 11:30


 12/24/20 11:31 DC 12/24/20 11:30


1 PATCH











Allergies:


Allergies:





Allergies








Coded Allergies Type Severity Reaction Last Updated Verified


 


  Penicillins Allergy Severe Swelling 5/5/14 Yes


 


  Sulfa (Sulfonamide Antibiotics) Allergy Severe THROAT SWELLING 5/5/14 Yes











Physical Exam:


PE:





Constitutional: Well developed, well nourished, no acute distress, non-toxic 

appearance. []


HENT: Normocephalic, atraumatic, bilateral external ears normal, oropharynx 

moist, no oral exudates, nose normal. []


Eyes: PERRLA, EOMI, conjunctiva normal, no discharge. [] 


Neck: Normal range of motion, no tenderness, supple, no stridor. [] 


Cardiovascular:Heart rate regular rhythm, no murmur []


Lungs & Thorax:  Bilateral breath sounds clear to auscultation [] tenderness 

over left ribs


Abdomen: Bowel sounds normal, soft, no tenderness, no masses, no pulsatile 

masses. [] 


Skin: Warm, dry, no erythema, no rash. [] 


Back: No tenderness, no CVA tenderness. [] 


Extremities: No tenderness, no cyanosis, no clubbing, ROM intact, no edema. [] 


Neurologic: Alert and oriented X 3, normal motor function, normal sensory 

function, no focal deficits noted. []


Psychologic: Affect normal, judgement normal, mood normal. []





Current Patient Data:


Vital Signs:





                                   Vital Signs








  Date Time  Temp Pulse Resp B/P (MAP) Pulse Ox O2 Delivery O2 Flow Rate FiO2


 


12/24/20 10:00 97.8 54 22 136/80 (98) 99 Room Air  





 97.8       











EKG:


EKG:


[]





Heart Score:


Risk Factors:


Risk Factors:  DM, Current or recent (<one month) smoker, HTN, HLP, family 

history of CAD, obesity.


Risk Scores:


Score 0 - 3:  2.5% MACE over next 6 weeks - Discharge Home


Score 4 - 6:  20.3% MACE over next 6 weeks - Admit for Clinical Observation


Score 7 - 10:  72.7% MACE over next 6 weeks - Early Invasive Strategies





Radiology/Procedures:


Radiology/Procedures:


XR CHEST 2V





History: Reason: FALL, LEFT-SIDED RIB PAIN LOWER RIB PAIN   





Comparison: Two-view chest September 27, 2019.





Findings: 


There are CABG changes. Cardiac size is normal. Pulmonary vasculature is normal.

 The lungs are clear. No pleural effusion or pneumothorax is seen. 


There is acute traumatic minimally displaced left lateral ninth rib fracture. No

 other adjacent rib fractures are definitely seen but nondisplaced or subtle rib

 fractures could be obscured due to bony overlap.





IMPRESSION: 


1.  No acute cardiopulmonary process.


2.  Acute traumatic minimally displaced left lateral ninth rib fracture.


[]





Course & Med Decision Making:


Course & Med Decision Making


Pertinent Labs and Imaging studies reviewed. (See chart for details)





[]





Dragon Disclaimer:


Dragon Disclaimer:


This electronic medical record was generated, in whole or in part, using a voice

 recognition dictation system.





Departure


Departure


Impression:  


   Primary Impression:  


   Fall


   Additional Impression:  


   Left rib fracture


Disposition:  01 DC HOME SELF CARE/HOMELESS


Condition:  STABLE


Referrals:  


LORETTA MONTERO MD (PCP)


Patient Instructions:  Rib Fracture





Additional Instructions:  


Use incentive spirometer frequently throughout the day while you are having 

pain.  May also use ibuprofen for pain with prescribed pain medications.  

Topical lidocaine patches available at most pharmacies.


Scripts


Hydrocodone/Apap 5-325 (NORCO 5-325 TABLET) 1 Each Tablet


1-2 TAB PO Q4-6HRS for pain for 7 Days, #20 TAB


   Prov: KAREN SERRANO MD         12/24/20





Problem Qualifiers











KAREN SERRANO MD              Dec 24, 2020 11:43

## 2022-02-09 ENCOUNTER — HOSPITAL ENCOUNTER (OUTPATIENT)
Dept: HOSPITAL 61 - KCIC US | Age: 72
End: 2022-02-09
Attending: FAMILY MEDICINE
Payer: MEDICARE

## 2022-02-09 DIAGNOSIS — I70.0: ICD-10-CM

## 2022-02-09 DIAGNOSIS — I71.4: Primary | ICD-10-CM

## 2022-02-09 PROCEDURE — 76770 US EXAM ABDO BACK WALL COMP: CPT

## 2022-02-09 NOTE — [facility]
EXAM: ULTRASOUND ABDOMINAL AORTA.



HISTORY: Abdominal aortic aneurysm. Cigarette smoking. Hypertension.



COMPARISON: CT dated 9/28/2020..



FINDINGS: Sonographic evaluation of the abdominal aorta and common iliac arteries was performed.



Proximally, the abdominal aorta measures 2.7 cm. In its midportion, 3.0 cm. Distally, 3.8 cm. There i
s calcified atherosclerotic plaque involving the aorta.



The right common iliac artery measures 1.3 cm. The left measures 1.0 cm.



There is no evidence of stenosis on Doppler.



IMPRESSION:

1. Distal abdominal aortic aneurysm measuring 3.8 cm. This appears to be minimally increased compared
 to the prior CT, allowing for differences in imaging modality.

2. Aortic atherosclerosis.



Abdominal aortic aneurysm measuring 3.5-3.9 cm as above. Recommend follow-up ultrasound or CTA in 2 y
ears per ACR and SVS recommendations.



Electronically signed by: Margoth Vines MD (2/9/2022 10:38 AM) [id number]